# Patient Record
Sex: MALE | NOT HISPANIC OR LATINO
[De-identification: names, ages, dates, MRNs, and addresses within clinical notes are randomized per-mention and may not be internally consistent; named-entity substitution may affect disease eponyms.]

---

## 2017-01-03 PROBLEM — Z00.00 ENCOUNTER FOR PREVENTIVE HEALTH EXAMINATION: Status: ACTIVE | Noted: 2017-01-03

## 2017-01-24 ENCOUNTER — APPOINTMENT (OUTPATIENT)
Dept: PULMONOLOGY | Facility: CLINIC | Age: 42
End: 2017-01-24

## 2017-01-24 VITALS — BODY MASS INDEX: 35.36 KG/M2 | HEIGHT: 68.5 IN | HEART RATE: 88 BPM | WEIGHT: 236 LBS | OXYGEN SATURATION: 95 %

## 2017-01-24 VITALS — SYSTOLIC BLOOD PRESSURE: 130 MMHG | DIASTOLIC BLOOD PRESSURE: 88 MMHG

## 2017-05-15 ENCOUNTER — APPOINTMENT (OUTPATIENT)
Dept: PULMONOLOGY | Facility: CLINIC | Age: 42
End: 2017-05-15

## 2017-05-15 VITALS
OXYGEN SATURATION: 95 % | WEIGHT: 242 LBS | DIASTOLIC BLOOD PRESSURE: 86 MMHG | BODY MASS INDEX: 36.26 KG/M2 | HEART RATE: 106 BPM | SYSTOLIC BLOOD PRESSURE: 132 MMHG

## 2017-11-17 ENCOUNTER — APPOINTMENT (OUTPATIENT)
Dept: PULMONOLOGY | Facility: CLINIC | Age: 42
End: 2017-11-17
Payer: COMMERCIAL

## 2017-11-17 VITALS
BODY MASS INDEX: 35.36 KG/M2 | OXYGEN SATURATION: 95 % | DIASTOLIC BLOOD PRESSURE: 88 MMHG | HEART RATE: 85 BPM | WEIGHT: 236 LBS | SYSTOLIC BLOOD PRESSURE: 144 MMHG

## 2017-11-17 PROCEDURE — 99214 OFFICE O/P EST MOD 30 MIN: CPT

## 2017-11-17 RX ORDER — UBIDECARENONE/VIT E ACET 100MG-5
1000 CAPSULE ORAL
Refills: 0 | Status: ACTIVE | COMMUNITY

## 2018-05-23 ENCOUNTER — APPOINTMENT (OUTPATIENT)
Dept: PULMONOLOGY | Facility: CLINIC | Age: 43
End: 2018-05-23
Payer: COMMERCIAL

## 2018-05-23 VITALS
BODY MASS INDEX: 34.46 KG/M2 | WEIGHT: 230 LBS | SYSTOLIC BLOOD PRESSURE: 130 MMHG | HEART RATE: 94 BPM | HEIGHT: 68.5 IN | DIASTOLIC BLOOD PRESSURE: 80 MMHG | OXYGEN SATURATION: 95 %

## 2018-05-23 PROCEDURE — 99214 OFFICE O/P EST MOD 30 MIN: CPT

## 2019-09-16 ENCOUNTER — APPOINTMENT (OUTPATIENT)
Dept: PULMONOLOGY | Facility: CLINIC | Age: 44
End: 2019-09-16
Payer: COMMERCIAL

## 2019-09-16 VITALS
HEART RATE: 74 BPM | OXYGEN SATURATION: 96 % | DIASTOLIC BLOOD PRESSURE: 80 MMHG | WEIGHT: 252 LBS | SYSTOLIC BLOOD PRESSURE: 130 MMHG | HEIGHT: 68.5 IN | BODY MASS INDEX: 37.76 KG/M2

## 2019-09-16 PROCEDURE — 99214 OFFICE O/P EST MOD 30 MIN: CPT

## 2019-09-16 NOTE — PHYSICAL EXAM
[General Appearance - In No Acute Distress] : no acute distress [Low Lying Soft Palate] : low lying soft palate [Normal Conjunctiva] : the conjunctiva exhibited no abnormalities [Elongated Uvula] : elongated uvula [Enlarged Base of the Tongue] : enlargement of the base of the tongue [IV] : IV [Heart Rate And Rhythm] : heart rate was normal and rhythm regular [Heart Sounds] : normal S1 and S2 [Exaggerated Use Of Accessory Muscles For Inspiration] : no accessory muscle use [Respiration, Rhythm And Depth] : normal respiratory rhythm and effort [Auscultation Breath Sounds / Voice Sounds] : lungs were clear to auscultation bilaterally [Abnormal Walk] : normal gait [Musculoskeletal - Swelling] : no joint swelling seen [Nail Clubbing] : no clubbing of the fingernails [Cyanosis, Localized] : no localized cyanosis [Cranial Nerves] : cranial nerves 2-12 were intact [Skin Color & Pigmentation] : normal skin color and pigmentation [No Focal Deficits] : no focal deficits [Oriented To Time, Place, And Person] : oriented to person, place, and time [Affect] : the affect was normal [Impaired Insight] : insight and judgment were intact [] : the neck was supple [Neck Circumference: ___] : neck circumference is [unfilled] [Normal Oropharynx] : abnormal oropharynx

## 2019-09-16 NOTE — HISTORY OF PRESENT ILLNESS
[Obstructive Sleep Apnea] : obstructive sleep apnea [Date: ___] : the most recent therapeutic polysomnogram was completed [unfilled] [BPAP w/IPAP: ____ cmH2O] : BPAP with IPAP: [unfilled] cmH2O [BPAP w/EPAP: ___ cmH2O] : BPAP with EPAP: [unfilled] cmH2O [% Days used: ____] : Days used: [unfilled] % [% Days used > 4 hrs: ____] : Days used > 4 hrs: [unfilled] % [Therapy based AHI: ___ /hr] : Therapy based AHI: [unfilled] / hr [Snoring] : no snoring [Witnessed Apneas] : no witnessed sleep apnea [Frequent Nocturnal Awakening] : no nocturnal awakening [Daytime Somnolence] : no daytime somnolence [Unintentional Sleep While Inactive] : no unintentional sleep while inactive [Awakes Unrefreshed] : does not awake unrefreshed [Awakes with Headache] : no headache upon awakening [FreeTextEntry1] : Feeling well on bpap. \par \par Has gained significant weight since last year.

## 2019-09-16 NOTE — CONSULT LETTER
[Dear  ___] : Dear  [unfilled], [Courtesy Letter:] : I had the pleasure of seeing your patient, [unfilled], in my office today. [Consult Closing:] : Thank you very much for allowing me to participate in the care of this patient.  If you have any questions, please do not hesitate to contact me. [Vincenzo Ward MD] : Vincenzo Ward MD

## 2019-09-16 NOTE — REVIEW OF SYSTEMS
[EDS: ESS=____] : daytime somnolence: ESS=[unfilled] [Recent Wt Gain (___ Lbs)] : recent [unfilled] ~Ulb weight gain [Fatigue] : fatigue [Snoring] : snoring [Obesity] : obesity [Negative] : Psychiatric [Shortness Of Breath] : no shortness of breath

## 2019-12-30 ENCOUNTER — APPOINTMENT (OUTPATIENT)
Dept: PULMONOLOGY | Facility: CLINIC | Age: 44
End: 2019-12-30

## 2020-03-19 ENCOUNTER — APPOINTMENT (OUTPATIENT)
Dept: PULMONOLOGY | Facility: CLINIC | Age: 45
End: 2020-03-19
Payer: COMMERCIAL

## 2020-03-19 VITALS
BODY MASS INDEX: 41.29 KG/M2 | HEART RATE: 97 BPM | WEIGHT: 260 LBS | DIASTOLIC BLOOD PRESSURE: 84 MMHG | SYSTOLIC BLOOD PRESSURE: 122 MMHG | HEIGHT: 66.5 IN | OXYGEN SATURATION: 97 %

## 2020-03-19 DIAGNOSIS — Z87.891 PERSONAL HISTORY OF NICOTINE DEPENDENCE: ICD-10-CM

## 2020-03-19 DIAGNOSIS — E66.01 MORBID (SEVERE) OBESITY DUE TO EXCESS CALORIES: ICD-10-CM

## 2020-03-19 PROCEDURE — 99213 OFFICE O/P EST LOW 20 MIN: CPT

## 2022-07-25 ENCOUNTER — APPOINTMENT (OUTPATIENT)
Dept: PULMONOLOGY | Facility: CLINIC | Age: 47
End: 2022-07-25

## 2022-07-25 VITALS
HEIGHT: 67 IN | HEART RATE: 91 BPM | WEIGHT: 230 LBS | BODY MASS INDEX: 36.1 KG/M2 | OXYGEN SATURATION: 97 % | DIASTOLIC BLOOD PRESSURE: 80 MMHG | SYSTOLIC BLOOD PRESSURE: 108 MMHG

## 2022-07-25 DIAGNOSIS — E66.9 OBESITY, UNSPECIFIED: ICD-10-CM

## 2022-07-25 DIAGNOSIS — G47.33 OBSTRUCTIVE SLEEP APNEA (ADULT) (PEDIATRIC): ICD-10-CM

## 2022-07-25 PROCEDURE — 99214 OFFICE O/P EST MOD 30 MIN: CPT

## 2022-07-25 RX ORDER — CHLORHEXIDINE GLUCONATE 4 %
LIQUID (ML) TOPICAL
Refills: 0 | Status: DISCONTINUED | COMMUNITY
End: 2022-07-25

## 2022-09-08 ENCOUNTER — NON-APPOINTMENT (OUTPATIENT)
Age: 47
End: 2022-09-08

## 2022-11-23 ENCOUNTER — APPOINTMENT (OUTPATIENT)
Dept: ORTHOPEDIC SURGERY | Facility: CLINIC | Age: 47
End: 2022-11-23

## 2022-11-23 VITALS — BODY MASS INDEX: 36.1 KG/M2 | WEIGHT: 230 LBS | HEIGHT: 67 IN

## 2022-11-23 PROCEDURE — 99204 OFFICE O/P NEW MOD 45 MIN: CPT

## 2022-11-23 PROCEDURE — 72100 X-RAY EXAM L-S SPINE 2/3 VWS: CPT

## 2022-11-28 NOTE — PHYSICAL EXAM
[Flexion] : flexion [Extension] : extension [5___] : right extensor hallicus longus 5[unfilled]/5 [Normal Coordination] : normal coordination [Normal DTR UE/LE] : normal DTR UE/LE  [Normal Sensation] : normal sensation [Normal Mood and Affect] : normal mood and affect [Orientated] : orientated [Able to Communicate] : able to communicate [Normal Skin] : normal skin [No Rash] : no rash [No Ulcers] : no ulcers [No Lesions] : no lesions [No obvious lymphadenopathy in areas examined] : no obvious lymphadenopathy in areas examined [Well Developed] : well developed [Well Nourished] : well nourished [Peripheral vascular exam is grossly normal] : peripheral vascular exam is grossly normal [No Respiratory Distress] : no respiratory distress [de-identified] : Constitutional:\par - General Appearance:\par Unremarkable\par Body Habitus\par Well Developed\par Well Nourished\par Body Habitus\par No Deformities\par Well Groomed\par Ability To communicate:\par Normal\par Neurologic:\par Global sensation is intact to upper and lower extremities. See examination of Neck and/or Spine\par for exceptions.\par Orientation to Time, Place and Person is: Normal\par Mood And Affect is Normal\par Skin:\par - Head/Face, Right Upper/Lower Extremity, Left Upper/Lower Extremity: Normal\par See Examination of Neck and/or Spine for exceptions\par Cardiovascular:\par Peripheral Cardiovascular System is Normal\par Palpation of Lymph Nodes:\par Normal Palpation of lymph nodes in: Axilla, Cervical, Inguinal\par Abnormal Palpation of lymph nodes in: None  [] : non-antalgic [FreeTextEntry1] : 11/23/2022 - no vertebra body fractures. Reasonable preservation of disc space heights and lordosis. Mild degenerative disc disease L4-5 L5-S1 [TWNoteComboBox7] : forward flexion 15 degrees [de-identified] : extension 0 degrees

## 2022-11-28 NOTE — DISCUSSION/SUMMARY
[de-identified] : Discussed and reviewed results of lumbar x-ray taken in office. Patient given prescription for methocarbamol. Medication management and risks reviewed. I am prescribing the patient Meloxicam (15mg x7-10 days then 7.5mg up to BID PRN) for pain relief. Titration Schedule Provided. Patient was provided with a referral for lumbar physical therapy to work on stretching, strengthening and range of motion. F/u 5-6 weeks, will discuss MRI and NADIR at his next visit if no symptom improvement. \par \par Prior to appointment and during encounter with patient extensive medical records were reviewed including but not limited to, hospital records, outpatient records, imaging results, and lab data.During this appointment the patient was examined, diagnoses were discussed and explained in a face to face manner. In addition extensive time was spent reviewing aforementioned diagnostic studies. Counseling including abnormal image results, differential diagnoses, treatment options, risk and benefits, lifestyle changes, current condition, and current medications was performed. Patient's comments, questions, and concerns were addressed and patient verbalized understanding. Based on this patient's presentation at our office, which is an orthopedic spine surgeon's office, this patient inherently / intrinsically has a risk, however minute, of developing issues such as Cauda equina syndrome, bowel and bladder changes, or progression of motor or neurological deficits such as paralysis which may be permanent.\par \par ZORAIDA SUAREZ Acting as a Scribe for Dr. Iyer I, Zoraida Suarez, attest that this documentation has been prepared under the direction and in the presence of Provider Kieran Myers MD.

## 2022-11-28 NOTE — HISTORY OF PRESENT ILLNESS
[de-identified] : 11/23/2022 - The patient is a 47 year male who presents today for an initial evaluation of lumbar. Chronic hx of lumbar issues for 20 years. Chief complaint episodic radicular pain into the right lower extremity. Bilateral lower back pain is also present. No numbness/tingling or changes in strength in the lower extremities b/l.  No left sided lower extremity symptoms. Formal treatment with chiropractic care 3 times, discontinued due to aggravated symptoms. Formal treatment with muscle relaxer was helpful. Using Naprosyn, with relief. No current or formal treatments with PT. Increased severity of symptoms with bending and sleeping. General health is good. \par \par History of L4-5 spondylolisthesis. Symptoms present day are different in comparison to old symptoms. \par ----------\par Date of Injury/Onset: 2000, lifting\par Pain:    At Rest: 0/10 \par With Activity:  8/10 \par Quality of symptoms: achy, sharp\par Improves with: naprosyn, cyclobenzaprine\par Worse with: bending, prolonged sitting and standing, sleeping on side\par Prior treatment: chiropractor in past\par Prior Imaging: nothing new\par Additional Information: None\par \par \par \par

## 2022-11-28 NOTE — DATA REVIEWED
[Lumbar Spine] : lumbar spine [I independently reviewed and interpreted images and report] : I independently reviewed and interpreted images and report [I reviewed the films/CD and additionally noted] : I reviewed the films/CD and additionally noted [FreeTextEntry1] : I stop paperwork reviewed[

## 2023-02-03 ENCOUNTER — APPOINTMENT (OUTPATIENT)
Dept: ORTHOPEDIC SURGERY | Facility: CLINIC | Age: 48
End: 2023-02-03
Payer: COMMERCIAL

## 2023-02-03 VITALS — WEIGHT: 230 LBS | BODY MASS INDEX: 36.1 KG/M2 | HEIGHT: 67 IN

## 2023-02-03 DIAGNOSIS — M54.16 RADICULOPATHY, LUMBAR REGION: ICD-10-CM

## 2023-02-03 PROCEDURE — 99214 OFFICE O/P EST MOD 30 MIN: CPT

## 2023-02-03 RX ORDER — METHOCARBAMOL 750 MG/1
750 TABLET, FILM COATED ORAL
Qty: 30 | Refills: 0 | Status: ACTIVE | COMMUNITY
Start: 2022-11-23 | End: 1900-01-01

## 2023-02-03 RX ORDER — MELOXICAM 15 MG/1
15 TABLET ORAL DAILY
Qty: 30 | Refills: 1 | Status: ACTIVE | COMMUNITY
Start: 2022-11-23 | End: 1900-01-01

## 2023-02-05 NOTE — DISCUSSION/SUMMARY
[de-identified] : 47 year old male FUV for lumbar radiculopathy responding to conservative care with PT/NSAIDS/muscle relaxants.  He will continue Meloxicam 15 mg and Robaxin 750 mg PRN at night.  Patient educated on use of muscle relaxant at night.  Patient prefers to transition to [facility] and can collow up as needed with orthopedic spine. \par \par Prior to appointment and during encounter with patient extensive medical records were reviewed including but not limited to, hospital records, outpatient records, imaging results, and lab data.During this appointment the patient was examined, diagnoses were discussed and explained in a face to face manner. In addition extensive time was spent reviewing aforementioned diagnostic studies. Counseling including abnormal image results, differential diagnoses, treatment options, risk and benefits, lifestyle changes, current condition, and current medications was performed. Patient's comments, questions, and concerns were addressed and patient verbalized understanding. Based on this patient's presentation at our office, which is an orthopedic spine surgeon's office, this patient inherently / intrinsically has a risk, however minute, of developing issues such as Cauda equina syndrome, bowel and bladder changes, or progression of motor or neurological deficits such as paralysis which may be permanent.\par \par I, Jerome OTOOLE, personally performed the services described in this documentation incident to Kieran Bro MD. All medical record entries are made by the scribe were at my direction and in my presence.I have reviewed the chart and discharge instructions (If applicable) and agree that the record reflects my personal performance and is accurate and complete.\par \par We have discussed the risks, benefits, and alternatives NSAID therapy including but not limited to the risk of bleeding, thrombosis, gastric mucosal irritation/ulceration, allergic reaction and kidney dysfunction; the patient verbalizes an understanding.\par \par

## 2023-02-05 NOTE — PHYSICAL EXAM
[Normal Coordination] : normal coordination [Normal DTR UE/LE] : normal DTR UE/LE  [Normal Sensation] : normal sensation [Normal Mood and Affect] : normal mood and affect [Orientated] : orientated [Able to Communicate] : able to communicate [Normal Skin] : normal skin [No Rash] : no rash [No Ulcers] : no ulcers [No Lesions] : no lesions [No obvious lymphadenopathy in areas examined] : no obvious lymphadenopathy in areas examined [Well Developed] : well developed [Well Nourished] : well nourished [Peripheral vascular exam is grossly normal] : peripheral vascular exam is grossly normal [No Respiratory Distress] : no respiratory distress [Flexion] : flexion [Extension] : extension [de-identified] : Lumbar Spine Exam: \par \par \par Constitutional:  \par - No acute distress  \par - Well developed; well nourished  \par   \par Neurological:  \par - normal mood and affect  \par - alert and oriented x 3   \par   \par Cardiovascular:  \par - grossly normal\par   \par Inspection: \par erythema (-) \par ecchymosis (-) \par rashes (-) \par alignment: no scoliosis \par   \par Palpation: \par Midline lumbar tenderness:             (-) \par midline thoracic tenderness:          (-) \par Lumbar paraspinal tenderness:  	L (-) ; R (-) \par thoracic paraspinal tenderness:	L (-) ; R (-) \par sciatic nerve tenderness :         	L (-) ; R (-) \par SI joint tenderness:                    	L (-) ; R (-) \par GTB tenderness:                        	L (-);  R (-) \par   \par ROM:   \par ROM  -  \par   \par Strength: \par                                	Right   	Left    \par Hip Flexion:                (5/5)       (5/5) \par Quadriceps:               (5/5)       (5/5) \par Hamstrings:                (5/5)       (5/5) \par Ankle Dorsiflexion:    (5/5)       (5/5) \par EHL:                            (5/5)       (5/5) \par Ankle Plantarflexion:  (5/5)       (5/5) \par   \par Special Tests: \par SLR:                        	R (-) ; L (-) \par Facet loading:        	R (-) ; L (-) \par NIMO test:            	R (-) ; L (-) \par Hamstring tightness:  R (-);  L (-) \par   \par Neurologic: \par SILT throughout right lower extremity \par SILT throughout left lower extremity \par   \par Reflexes normal and symmetric bilateral lower extremities \par   \par Gait: \par non- antalgic gait \par ambulates without assistive device \par \par  [] : no lumbar paraspinal spasm

## 2023-02-05 NOTE — HISTORY OF PRESENT ILLNESS
[de-identified] : 02/03/2023: The patient is a 47 year male  who presents today follow up low back pain and RLE radicular pain which has improved significantly with physical therapy treatment and w/ PRN Meloxicam and Methocarbamol.  Patient is active in [facility] participation which has also helping his symptoms. \par \par Pain:    At Rest: _3/10 \par With Activity:  _3/10 \par Treatment: physical therapy\par taking meloxicam as needed\par 	\par \par 11/23/2022 - The patient is a 47 year male who presents today for an initial evaluation of lumbar. Chronic hx of lumbar issues for 20 years. Chief complaint episodic radicular pain into the right lower extremity. Bilateral lower back pain is also present. No numbness/tingling or changes in strength in the lower extremities b/l.  No left sided lower extremity symptoms. Formal treatment with chiropractic care 3 times, discontinued due to aggravated symptoms. Formal treatment with muscle relaxer was helpful. Using Naprosyn, with relief. No current or formal treatments with PT. Increased severity of symptoms with bending and sleeping. General health is good. \par \par History of L4-5 spondylolisthesis. Symptoms present day are different in comparison to old symptoms. \par ----------\par Date of Injury/Onset: 2000, lifting\par Pain:    At Rest: 0/10 \par With Activity:  8/10 \par Quality of symptoms: achy, sharp\par Improves with: naprosyn, cyclobenzaprine\par Worse with: bending, prolonged sitting and standing, sleeping on side\par Prior treatment: chiropractor in past\par Prior Imaging: nothing new\par Additional Information: None\par \par \par \par

## 2023-02-05 NOTE — DATA REVIEWED
[Outside X-rays] : outside x-rays [Lumbar Spine] : lumbar spine [I independently reviewed and interpreted images and report] : I independently reviewed and interpreted images and report [I reviewed the films/CD and additionally noted] : I reviewed the films/CD and additionally noted [FreeTextEntry1] : I stop paperwork reviewed\par PT progress notes reviewed

## 2024-06-11 ENCOUNTER — RX ONLY (RX ONLY)
Age: 49
End: 2024-06-11

## 2024-06-11 ENCOUNTER — OFFICE (OUTPATIENT)
Dept: URBAN - METROPOLITAN AREA CLINIC 6 | Facility: CLINIC | Age: 49
Setting detail: OPHTHALMOLOGY
End: 2024-06-11
Payer: COMMERCIAL

## 2024-06-11 DIAGNOSIS — H10.213: ICD-10-CM

## 2024-06-11 DIAGNOSIS — H10.33: ICD-10-CM

## 2024-06-11 PROCEDURE — 92002 INTRM OPH EXAM NEW PATIENT: CPT | Performed by: OPHTHALMOLOGY

## 2024-06-11 ASSESSMENT — CONFRONTATIONAL VISUAL FIELD TEST (CVF)
OS_FINDINGS: FULL
OD_FINDINGS: FULL